# Patient Record
Sex: MALE | Race: WHITE
[De-identification: names, ages, dates, MRNs, and addresses within clinical notes are randomized per-mention and may not be internally consistent; named-entity substitution may affect disease eponyms.]

---

## 2019-09-12 ENCOUNTER — HOSPITAL ENCOUNTER (OUTPATIENT)
Dept: HOSPITAL 35 - ULTRA | Age: 49
End: 2019-09-12
Attending: FAMILY MEDICINE
Payer: COMMERCIAL

## 2019-09-12 DIAGNOSIS — R10.13: Primary | ICD-10-CM

## 2021-05-26 ENCOUNTER — HOSPITAL ENCOUNTER (OUTPATIENT)
Dept: HOSPITAL 35 - CV | Age: 51
End: 2021-05-26
Attending: NURSE PRACTITIONER
Payer: COMMERCIAL

## 2021-05-26 DIAGNOSIS — I51.89: Primary | ICD-10-CM

## 2021-05-26 DIAGNOSIS — R07.9: ICD-10-CM

## 2021-05-26 NOTE — EXE
[facility]
[address]
[place]   [address]                   STRESS ECHOCARDIOGRAM         
_______________________________________________________________________________
 
Name:       MONSESHOAIBREGI RAE       Room #:                     REG [facility]#:      [id number]                       Account #:      [id number]  
Admission:  05/26/21    Attend Phys:    BRYAN Olguin      
Discharge:              Date of Birth:  [birth year]  
                                                          Report #: [id number]
                                                                    [id number]
_______________________________________________________________________________
THIS REPORT FOR:  
 
cc:  Kin Cervantes MD, Neal A. MD Lammoglia, Francisco J. MD                                        
                                                                       ~
 
--------------- APPROVED REPORT --------------
 
 
Study performed:  05/26/2021 10:04:23
 
Exam:  Stress Echocardiogram
Indication: Chest pain
Patient Location: Out-Patient
Stress Nurse: Alejandra Wilson RN
Status: routine
 
Ht: 6 ft 0 in      
HR: 45 bpm       BP: 100/80 mmHg 
Rhythm: Bradycardia   
 
Procedure
The patient underwent an Exercise Stress Test using the Shay 
Protocol. Blood pressure, heart rate, and EKG were monitored.
An Echocardiogram was performed by technician in four stages in quad 
fashion.  At peak stress, four selected images were obtained and 
placed side by side with resting images for comparison.
 
Stress Test Details
Stress Test:  Exercise stress testing was performed using a Shay 
protocol.      
HR           
Resting HR:            47 bpm   Max Heart Rate (APMHR): 170 bpm  
Max HR Achieved:  144 bpm   Target HR (85% APMHR): 144 bpm  
% of APMHR:         84         
Recovery HR:            76 bpm        
HR response to stress: Normal HR response to stress      
 
BP           
Resting BP:  100/80 mmHg        
Max BP:       142/80 mmHg        
Recovery BP:       150/70 mmHg        
BP response to stress: Normal blood pressure response to 
stress.      
ECG           
 
 
[facility]
[address]
[place]  [address]
Phone:  [phone]                    STRESS ECHOCARDIOGRAM         
_______________________________________________________________________________
 
Name:            TRINY SHEA       Room #:                    REG [facility]#:           [id number]          Account #:     [id number]  
Admission:       05/26/21         Attend Phys:   BRYAN Olguin  
Discharge:                  Date of Birth: [birth year]  
                         Report #:      [id number]
        [id number]
_______________________________________________________________________________
Resting ECG:  Sinus Bradycardia        
Stress ECG:     Sinus Rhythm        
Arrhythmia:    None         
Recovery ECG: Sinus Rhythm        
Recovery Arrhythmia: VPC        
 
Clinical
Reason for Termination: Maximal effort
Exercise duration: 13 min 40 sec
Highest Stage Achieved: Stage 5: 5.0 mph at 18% grade. 
Exercise capacity: 17.50 METs
Overall Exercise Capacity for Age: Excellent
 
Stress ECG Conclusion
1. Subjectively negative for ischemia
2. Electrocardiographically negative for ischemia
3. Satisfactory functional capacity
 
Pre-Stress Echo
The resting Echocardiogram showed normal left ventricular 
contractility with an estimated Ejection Fraction of about 55-60%. 
 
Post-Stress Echo
The stress Echocardiogram showed normal left ventricular 
contractility with an estimated Ejection Fraction of about 60-65%. 
Normal augmentation of wall motion in all segments on post stress 
images.
 
Clinical
No clinical or ECG evidence for ischemia.
 
Conclusion
Clinical Response:  Non-ischemic
Exercise Capacity:  Superior
Stress ECG Response:  Non-ischemic
Stress Echo Images:  Non-ischemic
Normal stress echocardiogram with maximal exercise stress. 
No clinical, EKG or echocardiographic evidence for ischemia. 
No echocardiographic evidence for exercise induced ischemia.
1. Low Ischemic Risk Study
 
Other Information
Study Quality: Good
 
<Conclusion>
Normal stress echocardiogram with maximal exercise stress. 
 
 
[facility]
[address]
[place]  [address]
Phone:  [phone]                    STRESS ECHOCARDIOGRAM         
_______________________________________________________________________________
 
Name:            TRINY SHEA       Room #:                    REG [facility]#:           [id number]          Account #:     [id number]  
Admission:       05/26/21         Attend Phys:   BRYAN Olguin  
Discharge:                  Date of Birth: [birth year]  
                         Report #:      [id number]
        [id number]
_______________________________________________________________________________
No clinical, EKG or echocardiographic evidence for ischemia. 
No echocardiographic evidence for exercise induced ischemia.
1. Low Ischemic Risk Study
 
 
 
 
 
 
 
 
 
 
 
 
 
 
 
 
 
 
 
 
 
 
 
 
 
 
 
 
 
 
 
 
 
 
 
 
 
 
 
 
 
  <ELECTRONICALLY SIGNED>
   By: Steven Buck MD    
  05/26/21     1340
D: 05/26/21 1340                           _____________________________________
T: 05/26/21 1340                           Steven Buck MD      /INF

## 2021-05-26 NOTE — 2DMMODE
[facility]
[place]   [address]                   2 D/M-MODE ECHOCARDIOGRAM     
_______________________________________________________________________________
 
Name:       TRINY SHEA       Room #:                     REG CLI 
M.R.#:      [id number]                       Account #:      [id number]  
Admission:  21    Attend Phys:    BRYAN Olguin      
Discharge:              Date of Birth:  [birth year]  
                                                          Report #: [id number]
                                                                    [id number]
_______________________________________________________________________________
THIS REPORT FOR:  
 
cc:  Kin Cervantes MD, Neal A. MD Santiago, Patrick MD [facility]                                         
                                                                       ~
 
--------------- APPROVED REPORT --------------
 
 
Study performed:  2021 08:52:46
 
EXAM: Comprehensive 2D, Doppler, and color-flow 
Echocardiogram 
Patient Location: Out-Patient   
      Status:  routine
 
       BSA:         2.21
HR: 43 bpm  BP:          100/80 mmHg 
Rhythm: Bradycardia     
 
Other Information 
Study Quality: Good
 
Indications
Chest Pain
 
2D Dimensions
RVDd:  38.88 mm   
IVSd:  9.39 (7-11mm)  LVOT Diam:  23.17 (18-24mm) 
LVDd:  49.53 mm   
PWd:  8.80 (7-11mm)  Ascending Ao:  30.90 (22-36mm)
LVDs:  32.57 (25-40mm)  
Left Atrium:  37.22 (27-40mm) 
Aortic Root:  31.80 mm  
 
Volumes
Left Atrial Volume (Systole) 
Single Plane 4CH:  37.97 mL Single Plane 2CH:  37.57 mL
Biplane LA Volume:  40.00 mL LA ESV Index:  18.00 mL/m2
 
Aortic Valve
AoV Peak Juan.:  1.39 m/s 
AO Peak Gr.:  8.69 mmHg  LVOT Max P.77 mmHg
    LVOT Max V:  1.19 m/s
JODI Vmax: 3.62 cm2  
 
 
[facility]
[address]
[place]  [address]
Phone:  [phone]                    2 D/M-MODE ECHOCARDIOGRAM     
_______________________________________________________________________________
 
Name:            TRINY SHEA       Room #:                    REG CL
M.R.#:           [id number]          Account #:     [id number]  
Admission:       21         Attend Phys:   BRYAN Olguin  
Discharge:                  Date of Birth: [birth year]  
                         Report #:      [id number]
        [id number]
_______________________________________________________________________________
 
Mitral Valve
    E/A Ratio:  1.6
    MV Decel. Time:  150.11 ms
MV E Max Juan.:  0.46 m/s 
MV A Juan.:  0.28 m/s  
MV PHT:  43.53 ms  
MVA (PHT):  2.11 cm2  
IVRT:  46.14 ms   
 
Pulmonary Valve
PV Peak Juan.:  1.00 m/s PV Peak Gr.:  3.97 mmHg
   IL End Vmax:  0.76 m/s
 
Pulmonary Vein
P Vein S:    0.59 m/s P Vein A:  0.27 m/s
P Vein D:   0.53 m/s P Vein A Dur.:  69.2 msec
P Vein S/D Ratio:  1.11 
 
Tricuspid Valve
TR Peak Juan.:  1.49 m/s RAP Estimate:  7.00 mmHg
TR Peak Gr.:  8.87 mmHg RVSP:  16.00 mmHg
 
Left Ventricle
The left ventricle is normal size. There is normal LV segmental wall 
motion. There is normal left ventricular wall thickness. Left 
ventricular systolic function is normal. The left ventricular 
ejection fraction is within the normal range. LVEF is 60-65%. The 
left ventricular diastolic function is normal.
 
Right Ventricle
The right ventricle is normal size. The right ventricular systolic 
function is normal.
 
Atria
The left atrium size is normal. The right atrium size is 
normal.
 
Aortic Valve
The aortic valve is normal in structure. No aortic regurgitation is 
present. There is no aortic valvular stenosis.
 
Mitral Valve
The mitral valve is normal in structure. Trace mitral regurgitation. 
No evidence of mitral valve stenosis.
 
 
 
[facility]
[address] [place]  [address]
Phone:  [phone]                    2 D/M-MODE ECHOCARDIOGRAM     
_______________________________________________________________________________
 
Name:            TRINY SHEA       Room #:                    REG [facility]#:           [id number]          Account #:     [id number]  
Admission:       21         Attend Phys:   BRYAN Olguin  
Discharge:                  Date of Birth: [birth year]  
                         Report #:      [id number]
        [id number]
_______________________________________________________________________________
Tricuspid Valve
The tricuspid valve is normal in structure. Trace tricuspid 
regurgitation.
 
Pulmonic Valve
The pulmonary valve is normal in structure. There is no pulmonic 
valvular regurgitation.
 
Great Vessels
The aortic root is normal in size. IVC is normal in size and 
collapses >50% with inspiration.
 
Pericardium
There is no pericardial effusion. There is no pleural 
effusion.
 
<Conclusion>
Normal ventricular size/wall thickness
Ejection fraction 60%
Grade 1 diastolic dysfunction
Normal right ventricular size/function
Normal atrial size
Color-flow Doppler study was performed of the 
aortic/mitral/tricuspid/pulmonary valve
Normal aortic/mitral valve structure and function
Trace tricuspid valve insufficiency
Normal aortic root size
No pericardial effusion
 
 
 
 
 
 
 
 
 
 
 
 
 
 
 
 
  <ELECTRONICALLY SIGNED>
   By: Shreyas Martinez MD, FACC    
  21     1155
D: 21 1155                           _____________________________________
T: 21 1155                           Shreyas Martinez MD, FACC      /INF